# Patient Record
Sex: FEMALE | Race: WHITE | NOT HISPANIC OR LATINO | Employment: UNEMPLOYED | URBAN - METROPOLITAN AREA
[De-identification: names, ages, dates, MRNs, and addresses within clinical notes are randomized per-mention and may not be internally consistent; named-entity substitution may affect disease eponyms.]

---

## 2018-08-21 ENCOUNTER — OFFICE VISIT (OUTPATIENT)
Dept: GASTROENTEROLOGY | Facility: CLINIC | Age: 28
End: 2018-08-21

## 2018-08-21 VITALS
BODY MASS INDEX: 20.91 KG/M2 | TEMPERATURE: 97.7 F | SYSTOLIC BLOOD PRESSURE: 108 MMHG | HEIGHT: 67 IN | DIASTOLIC BLOOD PRESSURE: 76 MMHG | WEIGHT: 133.2 LBS

## 2018-08-21 DIAGNOSIS — K21.9 GASTROESOPHAGEAL REFLUX DISEASE, ESOPHAGITIS PRESENCE NOT SPECIFIED: Primary | ICD-10-CM

## 2018-08-21 PROCEDURE — 99203 OFFICE O/P NEW LOW 30 MIN: CPT | Performed by: INTERNAL MEDICINE

## 2018-08-21 RX ORDER — ESCITALOPRAM OXALATE 10 MG/1
10 TABLET ORAL DAILY
COMMUNITY
End: 2019-10-07 | Stop reason: SDUPTHER

## 2018-08-21 NOTE — PROGRESS NOTES
Chief Complaint   Patient presents with   • Heartburn   • throat burning     Subjective      HPI     Sarah E Reyes is a 28 y.o. female who presents for evaluation of regurgitation and heartburn.        Vomiting after eating, usually within 5 minutes of eating.  Sx present for at least 3 years.  Worse with stress or menstrual periods  Last 5 days has had burning in throat/chest  Doesn't do well with ice cream and certain cheese.  No dysphagia.     Has seen GI in both [place] and [facility] in past - no prior endoscopy.      Past Medical History:   Diagnosis Date   • GERD (gastroesophageal reflux disease)        Current Outpatient Prescriptions:   •  escitalopram (LEXAPRO) 10 MG tablet, Take 10 mg by mouth Daily., Disp: , Rfl:   Allergies   Allergen Reactions   • Penicillins Hives     Social History     Social History   • Marital status:      Spouse name: N/A   • Number of children: N/A   • Years of education: N/A     Occupational History   • Not on file.     Social History Main Topics   • Smoking status: Never Smoker   • Smokeless tobacco: Not on file   • Alcohol use Not on file   • Drug use: Unknown   • Sexual activity: Not on file     Other Topics Concern   • Not on file     Social History Narrative   • No narrative on file     Family History   Problem Relation Age of Onset   • No Known Problems Mother    • No Known Problems Father      Review of Systems   Constitutional: Negative.    HENT: Positive for sore throat.    Respiratory: Negative.    Cardiovascular: Negative.    Gastrointestinal: Positive for vomiting.   Psychiatric/Behavioral: Negative.      Objective   Vitals:    08/21/18 1330   BP: 108/76   Temp: 97.7 °F (36.5 °C)     Physical Exam   Constitutional: She is oriented to person, place, and time. She appears well-developed and well-nourished.   HENT:   Mouth/Throat: Oropharynx is clear and moist.   Neck: Normal range of motion. Neck supple. No thyromegaly present.   Cardiovascular: Normal rate,  regular rhythm and normal heart sounds.    Pulmonary/Chest: Effort normal and breath sounds normal. No respiratory distress. She has no wheezes.   Abdominal: Soft. Bowel sounds are normal.   Lymphadenopathy:     She has no cervical adenopathy.   Neurological: She is alert and oriented to person, place, and time.   Skin: Skin is warm and dry.   Psychiatric: She has a normal mood and affect. Her behavior is normal. Judgment and thought content normal.   Vitals reviewed.    Assessment/Plan   Assessment:     1. Gastroesophageal reflux disease, esophagitis presence not specified    2.      Vomiting    Plan:   Recommend trial of low dose Prilosec  EGD will be scheduled  If negative, may consider gastric emptying scan        Jp Juarez M.D.  [facility]  [address] [place] [address]  Office: [phone]

## 2018-08-27 ENCOUNTER — ANESTHESIA EVENT (OUTPATIENT)
Dept: GASTROENTEROLOGY | Facility: HOSPITAL | Age: 28
End: 2018-08-27

## 2018-08-27 ENCOUNTER — HOSPITAL ENCOUNTER (OUTPATIENT)
Facility: HOSPITAL | Age: 28
Setting detail: HOSPITAL OUTPATIENT SURGERY
Discharge: HOME | End: 2018-08-27
Attending: INTERNAL MEDICINE | Admitting: INTERNAL MEDICINE

## 2018-08-27 ENCOUNTER — ANESTHESIA (OUTPATIENT)
Dept: GASTROENTEROLOGY | Facility: HOSPITAL | Age: 28
End: 2018-08-27

## 2018-08-27 VITALS
RESPIRATION RATE: 18 BRPM | TEMPERATURE: 97.6 F | HEART RATE: 61 BPM | WEIGHT: 133.06 LBS | BODY MASS INDEX: 20.88 KG/M2 | SYSTOLIC BLOOD PRESSURE: 100 MMHG | HEIGHT: 67 IN | DIASTOLIC BLOOD PRESSURE: 68 MMHG | OXYGEN SATURATION: 100 %

## 2018-08-27 DIAGNOSIS — K21.9 GASTROESOPHAGEAL REFLUX DISEASE, ESOPHAGITIS PRESENCE NOT SPECIFIED: ICD-10-CM

## 2018-08-27 LAB
B-HCG UR QL: NEGATIVE
INTERNAL NEGATIVE CONTROL: NEGATIVE
INTERNAL POSITIVE CONTROL: POSITIVE
Lab: NORMAL

## 2018-08-27 PROCEDURE — 81025 URINE PREGNANCY TEST: CPT | Performed by: INTERNAL MEDICINE

## 2018-08-27 PROCEDURE — 88312 SPECIAL STAINS GROUP 1: CPT | Performed by: INTERNAL MEDICINE

## 2018-08-27 PROCEDURE — 88305 TISSUE EXAM BY PATHOLOGIST: CPT | Performed by: INTERNAL MEDICINE

## 2018-08-27 PROCEDURE — 43239 EGD BIOPSY SINGLE/MULTIPLE: CPT | Performed by: INTERNAL MEDICINE

## 2018-08-27 PROCEDURE — 25010000002 PROPOFOL 10 MG/ML EMULSION: Performed by: ANESTHESIOLOGY

## 2018-08-27 RX ORDER — PROPOFOL 10 MG/ML
VIAL (ML) INTRAVENOUS CONTINUOUS PRN
Status: DISCONTINUED | OUTPATIENT
Start: 2018-08-27 | End: 2018-08-27 | Stop reason: SURG

## 2018-08-27 RX ORDER — SODIUM CHLORIDE, SODIUM LACTATE, POTASSIUM CHLORIDE, CALCIUM CHLORIDE 600; 310; 30; 20 MG/100ML; MG/100ML; MG/100ML; MG/100ML
30 INJECTION, SOLUTION INTRAVENOUS CONTINUOUS PRN
Status: DISCONTINUED | OUTPATIENT
Start: 2018-08-27 | End: 2018-08-27 | Stop reason: HOSPADM

## 2018-08-27 RX ORDER — PROPOFOL 10 MG/ML
VIAL (ML) INTRAVENOUS AS NEEDED
Status: DISCONTINUED | OUTPATIENT
Start: 2018-08-27 | End: 2018-08-27 | Stop reason: SURG

## 2018-08-27 RX ORDER — ESOMEPRAZOLE MAGNESIUM 40 MG/1
40 CAPSULE, DELAYED RELEASE ORAL DAILY
Qty: 30 CAPSULE | Refills: 5 | Status: SHIPPED | OUTPATIENT
Start: 2018-08-27 | End: 2018-11-06

## 2018-08-27 RX ORDER — LIDOCAINE HYDROCHLORIDE 20 MG/ML
INJECTION, SOLUTION INFILTRATION; PERINEURAL AS NEEDED
Status: DISCONTINUED | OUTPATIENT
Start: 2018-08-27 | End: 2018-08-27 | Stop reason: SURG

## 2018-08-27 RX ADMIN — SODIUM CHLORIDE, POTASSIUM CHLORIDE, SODIUM LACTATE AND CALCIUM CHLORIDE 30 ML/HR: 600; 310; 30; 20 INJECTION, SOLUTION INTRAVENOUS at 10:37

## 2018-08-27 RX ADMIN — LIDOCAINE HYDROCHLORIDE 60 MG: 20 INJECTION, SOLUTION INFILTRATION; PERINEURAL at 10:45

## 2018-08-27 RX ADMIN — PROPOFOL 100 MG: 10 INJECTION, EMULSION INTRAVENOUS at 10:45

## 2018-08-27 RX ADMIN — PROPOFOL 100 MCG/KG/MIN: 10 INJECTION, EMULSION INTRAVENOUS at 10:45

## 2018-08-27 NOTE — ANESTHESIA POSTPROCEDURE EVALUATION
Patient: Sarah E Reyes    Procedure Summary     Date:  08/27/18 Room / Location:  [facility] ENDOSCOPY 10 /  [facility]    Anesthesia Start:  1041 Anesthesia Stop:  1057    Procedure:  ESOPHAGOGASTRODUODENOSCOPY with biopsies (N/A Esophagus) Diagnosis:       Gastroesophageal reflux disease, esophagitis presence not specified      (Gastroesophageal reflux disease, esophagitis presence not specified [K21.9])    Surgeon:  Jp Juarez MD Provider:  Natalee Simpson MD    Anesthesia Type:  MAC ASA Status:  1          Anesthesia Type: MAC  Last vitals  BP   108/74 (08/27/18 1019)   Temp   36.7 °C (98 °F) (08/27/18 1019)   Pulse   58 (08/27/18 1019)   Resp   10 (08/27/18 1019)     SpO2   100 % (08/27/18 1019)     Post Anesthesia Care and Evaluation    Patient location during evaluation: bedside  Patient participation: complete - patient participated  Level of consciousness: awake  Pain management: adequate  Airway patency: patent  Anesthetic complications: No anesthetic complications    Cardiovascular status: acceptable  Respiratory status: acceptable  Hydration status: acceptable

## 2018-08-27 NOTE — ANESTHESIA PREPROCEDURE EVALUATION
Anesthesia Evaluation     Patient summary reviewed and Nursing notes reviewed                Airway   Mallampati: I  TM distance: >3 FB  Neck ROM: full  No difficulty expected  Dental - normal exam     Pulmonary - negative pulmonary ROS and normal exam   Cardiovascular - negative cardio ROS and normal exam        Neuro/Psych- negative ROS  GI/Hepatic/Renal/Endo - negative ROS     Musculoskeletal (-) negative ROS    Abdominal  - normal exam    Bowel sounds: normal.   Substance History - negative use     OB/GYN negative ob/gyn ROS         Other                        Anesthesia Plan    ASA 1     MAC     intravenous induction   Anesthetic plan and risks discussed with patient.

## 2018-08-28 LAB
CYTO UR: NORMAL
LAB AP CASE REPORT: NORMAL
LAB AP CLINICAL INFORMATION: NORMAL
PATH REPORT.FINAL DX SPEC: NORMAL
PATH REPORT.GROSS SPEC: NORMAL

## 2018-08-31 ENCOUNTER — TELEPHONE (OUTPATIENT)
Dept: GASTROENTEROLOGY | Facility: CLINIC | Age: 28
End: 2018-08-31

## 2018-08-31 DIAGNOSIS — R11.2 NAUSEA AND VOMITING, INTRACTABILITY OF VOMITING NOT SPECIFIED, UNSPECIFIED VOMITING TYPE: Primary | ICD-10-CM

## 2018-08-31 DIAGNOSIS — R12 HEARTBURN: ICD-10-CM

## 2018-09-21 ENCOUNTER — HOSPITAL ENCOUNTER (OUTPATIENT)
Dept: NUCLEAR MEDICINE | Facility: HOSPITAL | Age: 28
Discharge: HOME | End: 2018-09-21
Attending: INTERNAL MEDICINE

## 2018-09-21 DIAGNOSIS — R12 HEARTBURN: ICD-10-CM

## 2018-09-21 DIAGNOSIS — R11.2 NAUSEA AND VOMITING, INTRACTABILITY OF VOMITING NOT SPECIFIED, UNSPECIFIED VOMITING TYPE: ICD-10-CM

## 2018-09-21 PROCEDURE — 0 TECHNETIUM SULFUR COLLOID: Performed by: INTERNAL MEDICINE

## 2018-09-21 PROCEDURE — 78264 GASTRIC EMPTYING IMG STUDY: CPT

## 2018-09-21 PROCEDURE — A9541 TC99M SULFUR COLLOID: HCPCS | Performed by: INTERNAL MEDICINE

## 2018-09-21 RX ADMIN — TECHNETIUM TC 99M SULFUR COLLOID 1 DOSE: KIT at 07:22

## 2018-09-25 ENCOUNTER — TELEPHONE (OUTPATIENT)
Dept: GASTROENTEROLOGY | Facility: CLINIC | Age: 28
End: 2018-09-25

## 2018-09-25 DIAGNOSIS — K21.9 GASTROESOPHAGEAL REFLUX DISEASE, ESOPHAGITIS PRESENCE NOT SPECIFIED: Primary | ICD-10-CM

## 2018-09-25 DIAGNOSIS — R11.11 VOMITING WITHOUT NAUSEA, INTRACTABILITY OF VOMITING NOT SPECIFIED, UNSPECIFIED VOMITING TYPE: ICD-10-CM

## 2018-10-12 ENCOUNTER — APPOINTMENT (OUTPATIENT)
Dept: NUCLEAR MEDICINE | Facility: HOSPITAL | Age: 28
End: 2018-10-12
Attending: INTERNAL MEDICINE

## 2018-10-12 ENCOUNTER — APPOINTMENT (OUTPATIENT)
Dept: ULTRASOUND IMAGING | Facility: HOSPITAL | Age: 28
End: 2018-10-12
Attending: INTERNAL MEDICINE

## 2018-10-15 ENCOUNTER — OFFICE VISIT (OUTPATIENT)
Dept: ORTHOPEDIC SURGERY | Facility: CLINIC | Age: 28
End: 2018-10-15

## 2018-10-15 VITALS — HEIGHT: 67 IN | BODY MASS INDEX: 21.35 KG/M2 | WEIGHT: 136 LBS | TEMPERATURE: 98.4 F

## 2018-10-15 DIAGNOSIS — M92.71 FREIBERG'S DISEASE, RIGHT: Primary | ICD-10-CM

## 2018-10-15 DIAGNOSIS — M20.11 HALLUX VALGUS OF RIGHT FOOT: ICD-10-CM

## 2018-10-15 DIAGNOSIS — M85.60 BONE CYST: ICD-10-CM

## 2018-10-15 PROCEDURE — 73630 X-RAY EXAM OF FOOT: CPT | Performed by: ORTHOPAEDIC SURGERY

## 2018-10-15 PROCEDURE — 99204 OFFICE O/P NEW MOD 45 MIN: CPT | Performed by: ORTHOPAEDIC SURGERY

## 2018-10-15 NOTE — PROGRESS NOTES
New Patient Complaint      Patient: Sarah E Reyes  YOB: 1990 28 y.o. female  Medical Record Number: [id number]    Chief Complaints: Foot hurts    History of Present Illness:      She reports a 15 year history that is recently worsened over the last 2 years now severe intermittent stabbing aching pain around the base of the second toe worse with walking and running.  She's been seen in [place] in the past for this had multiple scans and some injections for her nerve type pain and symptoms have now worsened.    HPI    Allergies:   Allergies   Allergen Reactions   • Penicillins Unknown (See Comments)     Allergic as a child       Medications:   Current Outpatient Prescriptions on File Prior to Visit   Medication Sig   • escitalopram (LEXAPRO) 10 MG tablet Take 10 mg by mouth Daily.   • esomeprazole (nexIUM) 40 MG capsule Take 1 capsule by mouth Daily.     No current facility-administered medications on file prior to visit.        Past Medical History:   Diagnosis Date   • GERD (gastroesophageal reflux disease)      Past Surgical History:   Procedure Laterality Date   • ENDOSCOPY N/A 8/27/2018    Procedure: ESOPHAGOGASTRODUODENOSCOPY with biopsies;  Surgeon: Jp Juarez MD;  Location: [facility] ENDOSCOPY;  Service: Gastroenterology   • WISDOM TOOTH EXTRACTION       Social History     Occupational History   • Not on file.     Social History Main Topics   • Smoking status: Never Smoker   • Smokeless tobacco: Not on file   • Alcohol use 1.2 oz/week     2 Glasses of wine per week   • Drug use: No   • Sexual activity: Defer      Social History     Social History Narrative   • No narrative on file     Family History   Problem Relation Age of Onset   • No Known Problems Mother    • No Known Problems Father        Review of Systems: 14 point review of systems performed, positive pertinent findings identified in HPI. All remaining systems negative except night sweats, ringing in the ears, nausea  "vomiting, anxiety, eczema    Review of Systems      Physical Exam:   Vitals:    10/15/18 0857   Temp: 98.4 °F (36.9 °C)   Weight: 61.7 kg (136 lb)   Height: 170.2 cm (67\")     Physical Exam   Constitutional: pleasant, well developed   Eyes: sclera non icteric  Hearing : adequate for exam  Cardiovascular: palpable pulses in right foot, right calf/ thigh NT without sign of DVT  Respiratoy: breathing unlabored   Neurological: grossly sensate to LT throughout right LE  Psychiatric: oriented with normal mood and affect.   Lymphatic: No palpable popliteal lymphadenopathy right LE  Skin: intact throughout right leg/foot  Musculoskeletal: He is in September boots with about a 2-3 inch heel.  There is mild fullness around the second MTP joint with dorsal prominence and limited motion with discomfort on maximum dorsiflexion.  No focal pain plantarly under the second or third metatarsal and unable to elicit any neuritic symptoms in the second webspace.  Mild hallux valgus deformity with slight prominence dorsal medially but no pain to palpation or with range of motion.  No focal pain along the proximal aspect of the dorsolateral midfoot  Physical Exam  Ortho Exam    Radiology: 3 views the right foot ordered to evaluate pain reviewed and no prior x-rays available for comparison there appears to be avascular necrosis with some collapse of the second metatarsal head.  Very slight hallux valgus deformity as well as a well rounded cystic-appearing small lesion in the base of the fourth metatarsal.    Assessment/Plan: 1.  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  Right asymptomatic mild hallux valgus  3.  Right fourth metatarsal cystic change on x-ray proximally    Discussed treatment options with her today and told her that there was no \"fix\" that it would be never normal but I think we can improve painful symptoms.    We are going to get an MRI of her right foot for further evaluation of the cystic lesion " the base the fourth metatarsal as well as to the extent of avascular necrosis and see if there is any fragmentation or any intact cartilage over the second metatarsal head.    She understands to call to schedule follow-up appointment after MRI has been scheduled and rotary results the office.      Recommended that she see her primary care physician for non-orthopedic related issues outlined in review of symptoms

## 2018-10-23 ENCOUNTER — HOSPITAL ENCOUNTER (OUTPATIENT)
Dept: MRI IMAGING | Facility: HOSPITAL | Age: 28
Discharge: HOME | End: 2018-10-23
Attending: ORTHOPAEDIC SURGERY | Admitting: ORTHOPAEDIC SURGERY

## 2018-10-23 DIAGNOSIS — M92.71 FREIBERG'S DISEASE, RIGHT: ICD-10-CM

## 2018-10-23 PROCEDURE — 73718 MRI LOWER EXTREMITY W/O DYE: CPT

## 2018-10-26 ENCOUNTER — OFFICE VISIT (OUTPATIENT)
Dept: ORTHOPEDIC SURGERY | Facility: CLINIC | Age: 28
End: 2018-10-26

## 2018-10-26 VITALS — WEIGHT: 135.8 LBS | HEIGHT: 67 IN | TEMPERATURE: 98.2 F | BODY MASS INDEX: 21.31 KG/M2

## 2018-10-26 DIAGNOSIS — M92.71 FREIBERG'S DISEASE, RIGHT: Primary | ICD-10-CM

## 2018-10-26 PROCEDURE — 99213 OFFICE O/P EST LOW 20 MIN: CPT | Performed by: ORTHOPAEDIC SURGERY

## 2018-10-26 RX ORDER — VANCOMYCIN HYDROCHLORIDE 1 G/200ML
15 INJECTION, SOLUTION INTRAVENOUS ONCE
Status: CANCELLED | OUTPATIENT
Start: 2018-11-13

## 2018-10-26 NOTE — PROGRESS NOTES
"Foot Follow Up      Patient: Sarah E Reyes    YOB: 1990 28 y.o. female    Chief Complaints: Foot pain    History of Present Illness: Patient was initially seen on 10/15/18 and reported a 15 year history that is recently worsened over the last 2 years now severe intermittent stabbing aching pain around the base of the second toe worse with walking and running.  She's been seen in [place] in the past for this had multiple scans and some injections for her nerve type pain and symptoms have now worsened.    She was sent for MRI and is here today for further evaluation.      HPI    ROS: Foot pain  Past Medical History:   Diagnosis Date   • GERD (gastroesophageal reflux disease)      Physical Exam:   Vitals:    10/26/18 1522   Temp: 98.2 °F (36.8 °C)   TempSrc: Temporal Artery    Weight: 61.6 kg (135 lb 12.8 oz)   Height: 168.9 cm (66.5\")     Well developed with normal mood.  On exam there was mild swelling but no warmth erythema around the second MTP joint with palpable dorsal prominence and limited motion with discomfort.  Mild hallux valgus deformity but no tenderness to palpation or with range of motion.  She is nontender to the third metatarsal      Radiology: MRI films and report of the right foot dated 10/23/18 were reviewed and show chronic Freiberg's infraction there is cortical irregularity and depression of the second metatarsal head and development of a displaced chronic ossification extending above the medial aspect of the joint measuring 7 mm x 5 mm x 4 mm with second MTP joint effusion.  There is marrow edema within the head with mild subcortical cyst.  Marrow signal is normal in the second proximal phalanx      Assessment/Plan:  1.  Asymptomatic right mild hallux valgus  2.  Right second metatarsalgia with Freiberg's infraction.    We are long discussion regarding treatment options and reviewed there are multiple treatment methods and I reviewed those with her and she would like to " proceed with the simplest procedure as possible.  I would recommend exploration of the joint with debridement of any loose chondral fragments and synovitis.  At this point I would not plan on any type of realignment osteotomies or interpositional arthroplasties.    I reviewed the procedure and postoperative course in detail with her with associated risks benefits potential outcomes and complications which can include but are not limited to heart attack stroke death pneumonia infection bleeding damage to blood vessels and nerves or tendons blood clots pulmonary embolism persistent or worsening pain stiffness need for subsequent surgery and failure to return to presurgery and precondition levels of activity.  All of her questions answered to her full satisfaction will plan to proceed with this on an outpatient basis at a mutually convenient time.  She was encouraged to call she has any questions prior to surgery

## 2018-11-06 ENCOUNTER — APPOINTMENT (OUTPATIENT)
Dept: PREADMISSION TESTING | Facility: HOSPITAL | Age: 28
End: 2018-11-06

## 2018-11-06 VITALS
HEART RATE: 5 BPM | TEMPERATURE: 97.3 F | OXYGEN SATURATION: 100 % | RESPIRATION RATE: 18 BRPM | SYSTOLIC BLOOD PRESSURE: 109 MMHG | BODY MASS INDEX: 20.4 KG/M2 | WEIGHT: 130 LBS | HEIGHT: 67 IN | DIASTOLIC BLOOD PRESSURE: 75 MMHG

## 2018-11-06 LAB
DEPRECATED RDW RBC AUTO: 44.7 FL (ref 37–54)
ERYTHROCYTE [DISTWIDTH] IN BLOOD BY AUTOMATED COUNT: 12.8 % (ref 11.7–13)
HCT VFR BLD AUTO: 40.5 % (ref 35.6–45.5)
HGB BLD-MCNC: 13.2 G/DL (ref 11.9–15.5)
MCH RBC QN AUTO: 31.3 PG (ref 26.9–32)
MCHC RBC AUTO-ENTMCNC: 32.6 G/DL (ref 32.4–36.3)
MCV RBC AUTO: 96 FL (ref 80.5–98.2)
PLATELET # BLD AUTO: 241 10*3/MM3 (ref 140–500)
PMV BLD AUTO: 9.5 FL (ref 6–12)
RBC # BLD AUTO: 4.22 10*6/MM3 (ref 3.9–5.2)
WBC NRBC COR # BLD: 5.44 10*3/MM3 (ref 4.5–10.7)

## 2018-11-06 PROCEDURE — 85027 COMPLETE CBC AUTOMATED: CPT | Performed by: ORTHOPAEDIC SURGERY

## 2018-11-06 PROCEDURE — 36415 COLL VENOUS BLD VENIPUNCTURE: CPT

## 2018-11-06 NOTE — DISCHARGE INSTRUCTIONS
Take the following medications the morning of surgery with a small sip of water:        General Instructions:  • Do not eat solid food after midnight the night before surgery.  • You may drink clear liquids day of surgery but must stop at least one hour before your hospital arrival time.  • It is beneficial for you to have a clear drink that contains carbohydrates the day of surgery.  We suggest a 12 to 20 ounce bottle of Gatorade or Powerade for non-diabetic patients or a 12 to 20 ounce bottle of G2 or Powerade Zero for diabetic patients. (Pediatric patients, are not advised to drink a 12 to 20 ounce carbohydrate drink)    Clear liquids are liquids you can see through.  Nothing red in color.     Plain water                               Sports drinks  Sodas                                   Gelatin (Jell-O)  Fruit juices without pulp such as white grape juice and apple juice  Popsicles that contain no fruit or yogurt  Tea or coffee (no cream or milk added)  Gatorade / Powerade  G2 / Powerade Zero    • Infants may have breast milk up to four hours before surgery.  • Infants drinking formula may drink formula up to six hours before surgery.   • Patients who avoid smoking, chewing tobacco and alcohol for 4 weeks prior to surgery have a reduced risk of post-operative complications.  Quit smoking as many days before surgery as you can.  • Do not smoke, use chewing tobacco or drink alcohol the day of surgery.   • If applicable bring your C-PAP/ BI-PAP machine.  • Bring any papers given to you in the doctor’s office.  • Wear clean comfortable clothes and socks.  • Do not wear contact lenses or make-up.  Bring a case for your glasses.   • Bring crutches or walker if applicable.  • Remove all piercings.  Leave jewelry and any other valuables at home.  • Hair extensions with metal clips must be removed prior to surgery.  • The Pre-Admission Testing nurse will instruct you to bring medications if unable to obtain an accurate  list in Pre-Admission Testing.        If you were given a blood bank ID arm band remember to bring it with you the day of surgery.    Preventing a Surgical Site Infection:  • For 2 to 3 days before surgery, avoid shaving with a razor because the razor can irritate skin and make it easier to develop an infection.    • Any areas of open skin can increase the risk of a post-operative wound infection by allowing bacteria to enter and travel throughout the body.  Notify your surgeon if you have any skin wounds / rashes even if it is not near the expected surgical site.  The area will need assessed to determine if surgery should be delayed until it is healed.  • The night prior to surgery sleep in a clean bed with clean clothing.  Do not allow pets to sleep with you.  • Shower on the morning of surgery using a fresh bar of anti-bacterial soap (such as Dial) and clean washcloth.  Dry with a clean towel and dress in clean clothing.  • Ask your surgeon if you will be receiving antibiotics prior to surgery.  • Make sure you, your family, and all healthcare providers clean their hands with soap and water or an alcohol based hand  before caring for you or your wound.    Day of surgery:  Upon arrival, a Pre-op nurse and Anesthesiologist will review your health history, obtain vital signs, and answer questions you may have.  The only belongings needed at this time will be your home medications and if applicable your C-PAP/BI-PAP machine.  If you are staying overnight your family can leave the rest of your belongings in the car and bring them to your room later.  A Pre-op nurse will start an IV and you may receive medication in preparation for surgery, including something to help you relax.  Your family will be able to see you in the Pre-op area.  While you are in surgery your family should notify the waiting room  if they leave the waiting room area and provide a contact phone number.    Please be aware that  surgery does come with discomfort.  We want to make every effort to control your discomfort so please discuss any uncontrolled symptoms with your nurse.   Your doctor will most likely have prescribed pain medications.      If you are going home after surgery you will receive individualized written care instructions before being discharged.  A responsible adult must drive you to and from the hospital on the day of your surgery and stay with you for 24 hours.    If you are staying overnight following surgery, you will be transported to your hospital room following the recovery period.  [facility] has all private rooms.    You have received a list of surgical assistants for your reference.  If you have any questions please call Pre-Admission Testing at [phone].  Deductibles and co-payments are collected on the day of service. Please be prepared to pay the required co-pay, deductible or deposit on the day of service as defined by your plan.

## 2018-11-07 ENCOUNTER — TELEPHONE (OUTPATIENT)
Dept: GASTROENTEROLOGY | Facility: CLINIC | Age: 28
End: 2018-11-07

## 2018-11-07 NOTE — TELEPHONE ENCOUNTER
----- Message from Jp Juarez MD sent at 11/2/2018  1:34 PM EDT -----  Attempted to call pt to explain possibility of erroneous results from her GES  Left VM to call back

## 2018-11-11 NOTE — H&P
"Patient: Sarah E Reyes     YOB: 1990 28 y.o. female     Chief Complaints: Foot pain     History of Present Illness: Patient was initially seen on 10/15/18 and reported a 15 year history that is recently worsened over the last 2 years now severe intermittent stabbing aching pain around the base of the second toe worse with walking and running.  She's been seen in [place] in the past for this had multiple scans and some injections for her nerve type pain and symptoms have now worsened.     She was sent for MRI and is here today for further evaluation.        HPI     ROS: Foot pain  Medical History        Past Medical History:   Diagnosis Date   • GERD (gastroesophageal reflux disease)           Physical Exam:   Vitals       Vitals:     10/26/18 1522   Temp: 98.2 °F (36.8 °C)   TempSrc: Temporal Artery    Weight: 61.6 kg (135 lb 12.8 oz)   Height: 168.9 cm (66.5\")         Well developed with normal mood.  On exam there was mild swelling but no warmth erythema around the second MTP joint with palpable dorsal prominence and limited motion with discomfort.  Mild hallux valgus deformity but no tenderness to palpation or with range of motion.  She is nontender to the third metatarsal        Radiology: MRI films and report of the right foot dated 10/23/18 were reviewed and show chronic Freiberg's infraction there is cortical irregularity and depression of the second metatarsal head and development of a displaced chronic ossification extending above the medial aspect of the joint measuring 7 mm x 5 mm x 4 mm with second MTP joint effusion.  There is marrow edema within the head with mild subcortical cyst.  Marrow signal is normal in the second proximal phalanx        Assessment/Plan:  1.  Asymptomatic right mild hallux valgus  2.  Right second metatarsalgia with Freiberg's infraction.     We are long discussion regarding treatment options and reviewed there are multiple treatment methods and I reviewed those " with her and she would like to proceed with the simplest procedure as possible.  I would recommend exploration of the joint with debridement of any loose chondral fragments and synovitis.  At this point I would not plan on any type of realignment osteotomies or interpositional arthroplasties.     I reviewed the procedure and postoperative course in detail with her with associated risks benefits potential outcomes and complications which can include but are not limited to heart attack stroke death pneumonia infection bleeding damage to blood vessels and nerves or tendons blood clots pulmonary embolism persistent or worsening pain stiffness need for subsequent surgery and failure to return to presurgery and precondition levels of activity.  All of her questions answered to her full satisfaction will plan to proceed with this on an outpatient basis at a mutually convenient time.  She was encouraged to call she has any questions prior to surgery

## 2018-11-12 NOTE — TELEPHONE ENCOUNTER
Recalculation suggests it may be slightly abnormal, but doubt clinically significant.    Would be happy to discuss over phone or she can certainly come in to office to discuss

## 2018-11-12 NOTE — TELEPHONE ENCOUNTER
Called pt and advised of the note from Dr Juarez. She verb understanding and asks that MD call her to discuss findings and her current symptoms. She is still having issues and the next step he wanted was U/S and HIDA. She has not scheduled that yet but would like to discuss first, then maybe schedule tests vs office visit. Advised will send her message back to Dr ANDERSON and have him call her. Pt verb understanding.

## 2018-11-13 ENCOUNTER — ANESTHESIA (OUTPATIENT)
Dept: PERIOP | Facility: HOSPITAL | Age: 28
End: 2018-11-13

## 2018-11-13 ENCOUNTER — TELEPHONE (OUTPATIENT)
Dept: ORTHOPEDIC SURGERY | Facility: CLINIC | Age: 28
End: 2018-11-13

## 2018-11-13 ENCOUNTER — HOSPITAL ENCOUNTER (OUTPATIENT)
Facility: HOSPITAL | Age: 28
Setting detail: HOSPITAL OUTPATIENT SURGERY
Discharge: HOME | End: 2018-11-13
Attending: ORTHOPAEDIC SURGERY | Admitting: ORTHOPAEDIC SURGERY

## 2018-11-13 ENCOUNTER — ANESTHESIA EVENT (OUTPATIENT)
Dept: PERIOP | Facility: HOSPITAL | Age: 28
End: 2018-11-13

## 2018-11-13 VITALS
SYSTOLIC BLOOD PRESSURE: 111 MMHG | OXYGEN SATURATION: 99 % | TEMPERATURE: 97.8 F | HEART RATE: 74 BPM | RESPIRATION RATE: 16 BRPM | DIASTOLIC BLOOD PRESSURE: 72 MMHG

## 2018-11-13 DIAGNOSIS — M92.71 FREIBERG'S DISEASE, RIGHT: ICD-10-CM

## 2018-11-13 PROCEDURE — 25010000002 VANCOMYCIN PER 500 MG: Performed by: ORTHOPAEDIC SURGERY

## 2018-11-13 PROCEDURE — 25010000002 MIDAZOLAM PER 1 MG: Performed by: ANESTHESIOLOGY

## 2018-11-13 PROCEDURE — 25010000002 FENTANYL CITRATE (PF) 100 MCG/2ML SOLUTION: Performed by: ANESTHESIOLOGY

## 2018-11-13 PROCEDURE — 25010000002 DEXAMETHASONE PER 1 MG: Performed by: NURSE ANESTHETIST, CERTIFIED REGISTERED

## 2018-11-13 PROCEDURE — 25010000002 PROPOFOL 10 MG/ML EMULSION: Performed by: NURSE ANESTHETIST, CERTIFIED REGISTERED

## 2018-11-13 PROCEDURE — 25010000002 ONDANSETRON PER 1 MG: Performed by: NURSE ANESTHETIST, CERTIFIED REGISTERED

## 2018-11-13 PROCEDURE — 25010000002 KETOROLAC TROMETHAMINE PER 15 MG: Performed by: NURSE ANESTHETIST, CERTIFIED REGISTERED

## 2018-11-13 PROCEDURE — 28122 PARTIAL REMOVAL OF FOOT BONE: CPT | Performed by: ORTHOPAEDIC SURGERY

## 2018-11-13 PROCEDURE — 81025 URINE PREGNANCY TEST: CPT | Performed by: ANESTHESIOLOGY

## 2018-11-13 RX ORDER — PROMETHAZINE HYDROCHLORIDE 25 MG/ML
12.5 INJECTION, SOLUTION INTRAMUSCULAR; INTRAVENOUS ONCE AS NEEDED
Status: DISCONTINUED | OUTPATIENT
Start: 2018-11-13 | End: 2018-11-13 | Stop reason: HOSPADM

## 2018-11-13 RX ORDER — PROMETHAZINE HYDROCHLORIDE 25 MG/1
25 SUPPOSITORY RECTAL ONCE AS NEEDED
Status: DISCONTINUED | OUTPATIENT
Start: 2018-11-13 | End: 2018-11-13 | Stop reason: HOSPADM

## 2018-11-13 RX ORDER — FAMOTIDINE 10 MG/ML
20 INJECTION, SOLUTION INTRAVENOUS ONCE
Status: COMPLETED | OUTPATIENT
Start: 2018-11-13 | End: 2018-11-13

## 2018-11-13 RX ORDER — ONDANSETRON 2 MG/ML
INJECTION INTRAMUSCULAR; INTRAVENOUS AS NEEDED
Status: DISCONTINUED | OUTPATIENT
Start: 2018-11-13 | End: 2018-11-13 | Stop reason: SURG

## 2018-11-13 RX ORDER — OXYCODONE HYDROCHLORIDE AND ACETAMINOPHEN 5; 325 MG/1; MG/1
1 TABLET ORAL ONCE AS NEEDED
Status: DISCONTINUED | OUTPATIENT
Start: 2018-11-13 | End: 2018-11-13 | Stop reason: HOSPADM

## 2018-11-13 RX ORDER — PROMETHAZINE HYDROCHLORIDE 25 MG/1
25 TABLET ORAL ONCE AS NEEDED
Status: DISCONTINUED | OUTPATIENT
Start: 2018-11-13 | End: 2018-11-13 | Stop reason: HOSPADM

## 2018-11-13 RX ORDER — SODIUM CHLORIDE, SODIUM LACTATE, POTASSIUM CHLORIDE, CALCIUM CHLORIDE 600; 310; 30; 20 MG/100ML; MG/100ML; MG/100ML; MG/100ML
9 INJECTION, SOLUTION INTRAVENOUS CONTINUOUS
Status: DISCONTINUED | OUTPATIENT
Start: 2018-11-13 | End: 2018-11-13 | Stop reason: HOSPADM

## 2018-11-13 RX ORDER — LABETALOL HYDROCHLORIDE 5 MG/ML
5 INJECTION, SOLUTION INTRAVENOUS
Status: DISCONTINUED | OUTPATIENT
Start: 2018-11-13 | End: 2018-11-13 | Stop reason: HOSPADM

## 2018-11-13 RX ORDER — DIPHENHYDRAMINE HYDROCHLORIDE 50 MG/ML
12.5 INJECTION INTRAMUSCULAR; INTRAVENOUS
Status: DISCONTINUED | OUTPATIENT
Start: 2018-11-13 | End: 2018-11-13 | Stop reason: HOSPADM

## 2018-11-13 RX ORDER — MIDAZOLAM HYDROCHLORIDE 1 MG/ML
2 INJECTION INTRAMUSCULAR; INTRAVENOUS
Status: DISCONTINUED | OUTPATIENT
Start: 2018-11-13 | End: 2018-11-13 | Stop reason: HOSPADM

## 2018-11-13 RX ORDER — DEXAMETHASONE SODIUM PHOSPHATE 10 MG/ML
INJECTION INTRAMUSCULAR; INTRAVENOUS AS NEEDED
Status: DISCONTINUED | OUTPATIENT
Start: 2018-11-13 | End: 2018-11-13 | Stop reason: SURG

## 2018-11-13 RX ORDER — ERYTHROMYCIN 500 MG/1
500 TABLET, COATED ORAL EVERY 6 HOURS
Qty: 8 TABLET | Refills: 0 | Status: SHIPPED | OUTPATIENT
Start: 2018-11-13 | End: 2018-11-26

## 2018-11-13 RX ORDER — FENTANYL CITRATE 50 UG/ML
50 INJECTION, SOLUTION INTRAMUSCULAR; INTRAVENOUS
Status: DISCONTINUED | OUTPATIENT
Start: 2018-11-13 | End: 2018-11-13 | Stop reason: HOSPADM

## 2018-11-13 RX ORDER — LIDOCAINE HYDROCHLORIDE 20 MG/ML
INJECTION, SOLUTION INFILTRATION; PERINEURAL AS NEEDED
Status: DISCONTINUED | OUTPATIENT
Start: 2018-11-13 | End: 2018-11-13 | Stop reason: SURG

## 2018-11-13 RX ORDER — ONDANSETRON 2 MG/ML
4 INJECTION INTRAMUSCULAR; INTRAVENOUS ONCE AS NEEDED
Status: DISCONTINUED | OUTPATIENT
Start: 2018-11-13 | End: 2018-11-13 | Stop reason: HOSPADM

## 2018-11-13 RX ORDER — NALOXONE HCL 0.4 MG/ML
0.2 VIAL (ML) INJECTION AS NEEDED
Status: DISCONTINUED | OUTPATIENT
Start: 2018-11-13 | End: 2018-11-13 | Stop reason: HOSPADM

## 2018-11-13 RX ORDER — SODIUM CHLORIDE 0.9 % (FLUSH) 0.9 %
1-10 SYRINGE (ML) INJECTION AS NEEDED
Status: DISCONTINUED | OUTPATIENT
Start: 2018-11-13 | End: 2018-11-13 | Stop reason: HOSPADM

## 2018-11-13 RX ORDER — OXYCODONE HYDROCHLORIDE AND ACETAMINOPHEN 5; 325 MG/1; MG/1
1-2 TABLET ORAL EVERY 4 HOURS PRN
Qty: 60 TABLET | Refills: 0 | Status: SHIPPED | OUTPATIENT
Start: 2018-11-13 | End: 2018-11-26

## 2018-11-13 RX ORDER — OXYCODONE AND ACETAMINOPHEN 7.5; 325 MG/1; MG/1
1 TABLET ORAL ONCE AS NEEDED
Status: DISCONTINUED | OUTPATIENT
Start: 2018-11-13 | End: 2018-11-13 | Stop reason: HOSPADM

## 2018-11-13 RX ORDER — FLUMAZENIL 0.1 MG/ML
0.2 INJECTION INTRAVENOUS AS NEEDED
Status: DISCONTINUED | OUTPATIENT
Start: 2018-11-13 | End: 2018-11-13 | Stop reason: HOSPADM

## 2018-11-13 RX ORDER — HYDROMORPHONE HYDROCHLORIDE 1 MG/ML
0.5 INJECTION, SOLUTION INTRAMUSCULAR; INTRAVENOUS; SUBCUTANEOUS
Status: DISCONTINUED | OUTPATIENT
Start: 2018-11-13 | End: 2018-11-13 | Stop reason: HOSPADM

## 2018-11-13 RX ORDER — PROMETHAZINE HYDROCHLORIDE 25 MG/1
12.5 TABLET ORAL ONCE AS NEEDED
Status: DISCONTINUED | OUTPATIENT
Start: 2018-11-13 | End: 2018-11-13 | Stop reason: HOSPADM

## 2018-11-13 RX ORDER — MAGNESIUM HYDROXIDE 1200 MG/15ML
LIQUID ORAL AS NEEDED
Status: DISCONTINUED | OUTPATIENT
Start: 2018-11-13 | End: 2018-11-13 | Stop reason: HOSPADM

## 2018-11-13 RX ORDER — HYDROCODONE BITARTRATE AND ACETAMINOPHEN 7.5; 325 MG/1; MG/1
1 TABLET ORAL ONCE AS NEEDED
Status: DISCONTINUED | OUTPATIENT
Start: 2018-11-13 | End: 2018-11-13 | Stop reason: HOSPADM

## 2018-11-13 RX ORDER — LIDOCAINE HYDROCHLORIDE 10 MG/ML
0.5 INJECTION, SOLUTION EPIDURAL; INFILTRATION; INTRACAUDAL; PERINEURAL ONCE AS NEEDED
Status: DISCONTINUED | OUTPATIENT
Start: 2018-11-13 | End: 2018-11-13 | Stop reason: HOSPADM

## 2018-11-13 RX ORDER — VANCOMYCIN HYDROCHLORIDE 1 G/200ML
15 INJECTION, SOLUTION INTRAVENOUS ONCE
Status: COMPLETED | OUTPATIENT
Start: 2018-11-13 | End: 2018-11-13

## 2018-11-13 RX ORDER — KETOROLAC TROMETHAMINE 30 MG/ML
INJECTION, SOLUTION INTRAMUSCULAR; INTRAVENOUS AS NEEDED
Status: DISCONTINUED | OUTPATIENT
Start: 2018-11-13 | End: 2018-11-13 | Stop reason: SURG

## 2018-11-13 RX ORDER — ACETAMINOPHEN 650 MG
TABLET, EXTENDED RELEASE ORAL AS NEEDED
Status: DISCONTINUED | OUTPATIENT
Start: 2018-11-13 | End: 2018-11-13 | Stop reason: HOSPADM

## 2018-11-13 RX ORDER — MIDAZOLAM HYDROCHLORIDE 1 MG/ML
1 INJECTION INTRAMUSCULAR; INTRAVENOUS
Status: DISCONTINUED | OUTPATIENT
Start: 2018-11-13 | End: 2018-11-13 | Stop reason: HOSPADM

## 2018-11-13 RX ORDER — PROPOFOL 10 MG/ML
VIAL (ML) INTRAVENOUS AS NEEDED
Status: DISCONTINUED | OUTPATIENT
Start: 2018-11-13 | End: 2018-11-13 | Stop reason: SURG

## 2018-11-13 RX ORDER — EPHEDRINE SULFATE 50 MG/ML
5 INJECTION, SOLUTION INTRAVENOUS ONCE AS NEEDED
Status: DISCONTINUED | OUTPATIENT
Start: 2018-11-13 | End: 2018-11-13 | Stop reason: HOSPADM

## 2018-11-13 RX ADMIN — FAMOTIDINE 20 MG: 10 INJECTION, SOLUTION INTRAVENOUS at 08:41

## 2018-11-13 RX ADMIN — MIDAZOLAM 1 MG: 1 INJECTION INTRAMUSCULAR; INTRAVENOUS at 08:48

## 2018-11-13 RX ADMIN — MIDAZOLAM 2 MG: 1 INJECTION INTRAMUSCULAR; INTRAVENOUS at 08:47

## 2018-11-13 RX ADMIN — ONDANSETRON 4 MG: 2 INJECTION INTRAMUSCULAR; INTRAVENOUS at 09:59

## 2018-11-13 RX ADMIN — KETOROLAC TROMETHAMINE 30 MG: 30 INJECTION, SOLUTION INTRAMUSCULAR; INTRAVENOUS at 10:22

## 2018-11-13 RX ADMIN — DEXAMETHASONE SODIUM PHOSPHATE 8 MG: 10 INJECTION INTRAMUSCULAR; INTRAVENOUS at 09:59

## 2018-11-13 RX ADMIN — FENTANYL CITRATE 100 MCG: 50 INJECTION, SOLUTION INTRAMUSCULAR; INTRAVENOUS at 08:47

## 2018-11-13 RX ADMIN — SODIUM CHLORIDE, POTASSIUM CHLORIDE, SODIUM LACTATE AND CALCIUM CHLORIDE 9 ML/HR: 600; 310; 30; 20 INJECTION, SOLUTION INTRAVENOUS at 08:34

## 2018-11-13 RX ADMIN — LIDOCAINE HYDROCHLORIDE 100 MG: 20 INJECTION, SOLUTION INFILTRATION; PERINEURAL at 09:26

## 2018-11-13 RX ADMIN — SODIUM CHLORIDE, POTASSIUM CHLORIDE, SODIUM LACTATE AND CALCIUM CHLORIDE: 600; 310; 30; 20 INJECTION, SOLUTION INTRAVENOUS at 10:50

## 2018-11-13 RX ADMIN — PROPOFOL 170 MG: 10 INJECTION, EMULSION INTRAVENOUS at 09:26

## 2018-11-13 RX ADMIN — VANCOMYCIN HYDROCHLORIDE 1000 MG: 1 INJECTION, SOLUTION INTRAVENOUS at 08:59

## 2018-11-13 NOTE — BRIEF OP NOTE
INCISION AND DRAINAGE LOWER EXTREMITY  Progress Note    Sarah E Reyes  11/13/2018    Pre-op Diagnosis:   Freiberg's disease, right [M92.71]       Post-Op Diagnosis Codes:     * Freiberg's disease, right [M92.71]    Procedure/CPT® Codes:      Procedure(s):  Release and debridement of right second MTP joint and partial removal of metatarsal head    Surgeon(s):  Danial Michel MD    Anesthesia: General with Block    Staff:   Circulator: Javi Schneider RN; Becky Pavon RN  Scrub Person: Liliane Miles; Maddi Napoles    Estimated Blood Loss: minimal    Urine Voided: * No values recorded between 11/13/2018  9:17 AM and 11/13/2018 10:37 AM *    Specimens:                None      Drains:  None    TT 48 min    Findings: see dict    Complications:none      Danial Michel MD     Date: 11/13/2018  Time: 10:41 AM

## 2018-11-13 NOTE — ANESTHESIA PREPROCEDURE EVALUATION
Anesthesia Evaluation     Patient summary reviewed and Nursing notes reviewed   NPO Solid Status: > 8 hours  NPO Liquid Status: > 2 hours           Airway   Mallampati: I  TM distance: >3 FB  Neck ROM: full  Dental - normal exam     Pulmonary - negative pulmonary ROS and normal exam   Cardiovascular - negative cardio ROS and normal exam        Neuro/Psych- negative ROS  GI/Hepatic/Renal/Endo    (+)  GERD,      Musculoskeletal (-) negative ROS    Abdominal  - normal exam    Bowel sounds: normal.   Substance History - negative use     OB/GYN negative ob/gyn ROS         Other                          Anesthesia Plan    ASA 1     general and regional     Anesthetic plan, all risks, benefits, and alternatives have been provided, discussed and informed consent has been obtained with: patient.

## 2018-11-13 NOTE — ANESTHESIA POSTPROCEDURE EVALUATION
Patient: Sarah E Reyes    Procedure Summary     Date:  11/13/18 Room / Location:   [facility]  /  [facility]    Anesthesia Start:  0920 Anesthesia Stop:  1051    Procedure:  Debridement of right second MTP joint as needed (Right ) Diagnosis:       Freiberg's disease, right      (Freiberg's disease, right [M92.71])    Surgeon:  Danial Michel MD Provider:  Tnaner Gonzalez MD    Anesthesia Type:  general, regional ASA Status:  1          Anesthesia Type: general, regional  Last vitals  BP   125/77 (11/13/18 1127)   Temp   36.6 °C (97.8 °F) (11/13/18 1115)   Pulse   70 (11/13/18 1127)   Resp   16 (11/13/18 1127)     SpO2   99 % (11/13/18 1127)     Post Anesthesia Care and Evaluation    Patient location during evaluation: bedside  Patient participation: complete - patient participated  Level of consciousness: awake  Pain score: 2  Pain management: adequate  Airway patency: patent  Anesthetic complications: No anesthetic complications  PONV Status: none  Cardiovascular status: acceptable  Respiratory status: acceptable  Hydration status: acceptable    Comments: /77 (BP Location: Right arm, Patient Position: Sitting)   Pulse 70   Temp 36.6 °C (97.8 °F) (Temporal)   Resp 16   LMP 11/05/2018 (Exact Date)   SpO2 99%

## 2018-11-13 NOTE — TELEPHONE ENCOUNTER
I returned patient's phone call she had surgery this morning and I spoke with her .  When she got home and her foot hung down some she had some bleeding through on the gauze.  I reviewed the pictures that he sent on my chart does appear to be some saturation of the web roll but the bandages themselves look intact and toes appear to have good capillary refill.  They will elevate this reinforced the bandage and I will see them in the office in the morning to change this

## 2018-11-13 NOTE — TELEPHONE ENCOUNTER
Notes recorded by Jp Juarez MD on 11/12/2018 at 2:50 PM EST  Results d/w pt  She will go through with JANUSZ u/s and KATHLEEN, then we will regroup after that.

## 2018-11-13 NOTE — ANESTHESIA PROCEDURE NOTES
ANESTHESIA INTUBATION  Urgency: elective    Date/Time: 11/13/2018 9:36 AM  End Time:11/13/2018 9:36 AM  Airway not difficult    General Information and Staff    Patient location during procedure: OR  Anesthesiologist: Tanner Gonzalez MD  CRNA: Kaylen Renee CRNA    Indications and Patient Condition  Indications for airway management: airway protection    Preoxygenated: yes  MILS maintained throughout  Mask difficulty assessment: 1 - vent by mask    Final Airway Details  Final airway type: supraglottic airway      Successful airway: oropharyngeal  Size 4    Number of attempts at approach: 1

## 2018-11-13 NOTE — ANESTHESIA POSTPROCEDURE EVALUATION
Patient: Sarah E Reyes    Procedure Summary     Date:  11/13/18 Room / Location:   [facility]  [facility]  [facility]    Anesthesia Start:  0920 Anesthesia Stop:  1051    Procedure:  Debridement of right second MTP joint as needed (Right ) Diagnosis:       Freiberg's disease, right      (Freiberg's disease, right [M92.71])    Surgeon:  Danial Michel MD Provider:  Tanner Gonzalez MD    Anesthesia Type:  general, regional ASA Status:  1          Anesthesia Type: general, regional  Last vitals  BP   111/72 (11/13/18 1205)   Temp   36.6 °C (97.8 °F) (11/13/18 1115)   Pulse   74 (11/13/18 1205)   Resp   16 (11/13/18 1205)     SpO2   99 % (11/13/18 1205)     Post Anesthesia Care and Evaluation    Patient location during evaluation: bedside  Patient participation: complete - patient participated  Level of consciousness: awake  Pain score: 2  Pain management: adequate  Airway patency: patent  Anesthetic complications: No anesthetic complications  PONV Status: none  Cardiovascular status: acceptable  Respiratory status: acceptable  Hydration status: acceptable    Comments: /72 (BP Location: Right arm, Patient Position: Lying)   Pulse 74   Temp 36.6 °C (97.8 °F) (Temporal)   Resp 16   LMP 11/05/2018 (Exact Date)   SpO2 99%

## 2018-11-14 ENCOUNTER — OFFICE VISIT (OUTPATIENT)
Dept: ORTHOPEDIC SURGERY | Facility: CLINIC | Age: 28
End: 2018-11-14

## 2018-11-14 VITALS — WEIGHT: 130 LBS | TEMPERATURE: 98.1 F | HEIGHT: 67 IN | BODY MASS INDEX: 20.4 KG/M2

## 2018-11-14 DIAGNOSIS — M92.71 FREIBERG'S DISEASE, RIGHT: Primary | ICD-10-CM

## 2018-11-14 PROCEDURE — 99024 POSTOP FOLLOW-UP VISIT: CPT | Performed by: ORTHOPAEDIC SURGERY

## 2018-11-14 PROCEDURE — 29540 STRAPPING ANKLE &/FOOT: CPT | Performed by: ORTHOPAEDIC SURGERY

## 2018-11-14 NOTE — OP NOTE
Operative Note      Facility: [facility]  Patient Name: Sarah E Reyes  YOB: 1990  Date: 11/13/18  Medical Record Number: [id number]      Pre-op Diagnosis: 1.  Right second metatarsal Freiberg's infraction with dorsal fragmentation and joint contracture      Post-op Diagnosis:   1.  Right second metatarsal Freiberg's infraction with dorsal fragmentation and joint contracture      Procedure(s):  1.  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  She's been unimproved with conservative treatment and presents now for operative treatment.          Description of Procedure: Preoperative informed consent and anesthesia evaluation were obtained.  IV antibiotics were administered in the surgical site was marked.  Lock was administered by anesthesia.  Patient was brought to the operating room placed in supine position.  Anesthesia was induced and LMA was positioned.  Well-padded tourniquet was placed to the right lower leg.  Right foot and ankle were prepped and draped in a sterile fashion and surgical timeout was performed.  Preoperative motion of the second MTP joint was noted    Right foot was exsanguinated and pneumatic tourniquet inflated to 200 mmHg.  A medially based Chevron incision was created over the second  metatarsal phalangeal joint and full-thickness flaps were elevated with care taken to mobilize and protect subcutaneous veins and nerves were possible.  The extensor tendons were identified as was the dorsal cavanaugh expansion.  There was fullness to the joint noted and slight lateral deviation had been noted preoperatively.  The dorsal cavanaugh was divided between the longus and brevis tendons and extended proximally keeping these intact.  Full-thickness capsular flaps were elevated both medially and laterally and released somewhat laterally.  Immediately noted hyperemic synovium and a large amount of cartilaginous debris in the dorsum of the joint.  This was resected with a rongeur both dorsally medially and laterally back to more firm cancellus bone.  The base of the proximal phalanx was inspected and found to have some mild cartilaginous changes but no loose flaps.  The plantar 1/2-1/3 of the metatarsal head was intact and this cartilage was probed dorsally and found to be intact and not loose.  There was some mild fibrillary changes to the cartilage but no loose flaps.  I elected not to proceed with any further debridement or resection of the metatarsal head.  She had significantly improved motion and there was no instability noted on anterior drawer.    I did remove some dorsal bossing at the neck head junction..  The toe was taken to the range of motion and not found to have any further impingement.  I did resect the brevis proximally in order to relieve some tension on the joint but did not feel that she needed to have longus tenotomy.  The toe was stable and aligned neutrally with the other toes in a simulated standing position.  The wound was then irrigated copiously with dilute Betadine solution and the dorsal capsular tissue was closed with 3-0 Vicryl suture.  The wound was again irrigated copiously and closed with 4-0 Vicryl and 4-0 nylon.  The tourniquet was released and the wound was hemostatic and there was  prompt return of brisk capillary refill to the toe.    Sterile dressings were applied and she was placed into a foot and ankle spica-type dressing.  Oh stop if she was applied.  She was awake and transferred to stretcher and taken to recovery in stable condition

## 2018-11-14 NOTE — PROGRESS NOTES
"Foot Follow Up      Patient: Sarah E Reyes    YOB: 1990 28 y.o. female    Chief Complaints: Foot okay    History of Present Illness: Patient had surgery yesterday for debridement of Freiberg's infraction right second toe.  She had some bleeding through her dressing and is seen here today for dressing change.  Some mild aching pain but really \"not that bad\".  A little bit of tingling in her toe.  She did not start her postoperative antibiotics until this morning  HPI    ROS: Foot pain, no fevers chills chest pain shortness of breath  Past Medical History:   Diagnosis Date   • Anxiety    • GERD (gastroesophageal reflux disease)    • Right foot pain      Physical Exam:   Vitals:    11/14/18 0805   Temp: 98.1 °F (36.7 °C)   Weight: 59 kg (130 lb)   Height: 170.2 cm (67\")     Well developed with normal mood.  Right foot dressing was saturated with blood which is somewhat dried.  This was gently removed after moistening with saline.  There was no active bleeding from the wound or any evidence of wound dehiscence.  Brisk capillary refill to the toe and grossly sensate to light touch at the tip of the second toe      Radiology: None performed      Assessment/Plan:  Status post right Freiberg's infraction second toe debridement.  Dressing was changed.  She'll continue nonweightbearing and elevation and I will see her back in approximately 10 days  "

## 2018-11-16 ENCOUNTER — TELEPHONE (OUTPATIENT)
Dept: ORTHOPEDIC SURGERY | Facility: CLINIC | Age: 28
End: 2018-11-16

## 2018-11-16 NOTE — TELEPHONE ENCOUNTER
Have discussed with Dr. Michel.  Have left a prescription at the  for hydrocodone 7.5/325 mg #50 directions her to take 1-2 tablets every 4 hours when necessary pain.  Patient has been notified to pickup the prescription

## 2018-11-16 NOTE — TELEPHONE ENCOUNTER
Regarding: Prescription Question  Contact: [phone]  ----- Message from [employer], Generic sent at 11/16/2018  4:56 AM EST -----    Dr. Michel,  The nerve block wore off on my foot Wednesday around 10pm. I was woken up every 2 hours in intense pain even with taking 1 oxy every 4 hours. To substitute for taking more meds than I should within the 4 hour timeline I iced my foot and took Tylenol. I was in a lot of pain all day Thursday and again all through the night tonight. I ended up taking 1 oxy every 2 hours a few times thursday because of the extreme pain. I have had a terrible headache since about 9pm Thursday and itch a lot. My question is if oxy is the best pain medication and if you have some suggestions on how to take it or if there is something you can think of that would be a better alternative?

## 2018-11-16 NOTE — TELEPHONE ENCOUNTER
Call return to the patient.  She called complaining of a headache which she thought was due to her Percocet.  Over the first 2 days after surgery she was having increased pain so she was taking it every 2 hours.  Last night she didn't take anything for about 9 hours and did take one this morning and 11:00 and states that her headache is now resolved.  Complaining of itching which could also be related to the Percocet.  I reinforced the fact that she needs to take her medicine every 4 hours rather than every 2.  Headache could indeed been from her Percocet.  Have discussed with her about trying some hydrocodone.  And patient is agreeable.  She denies that her dressing is too tight and toes are warm and pain.  Will discuss with Dr. Michel

## 2018-11-21 ENCOUNTER — HOSPITAL ENCOUNTER (OUTPATIENT)
Dept: NUCLEAR MEDICINE | Facility: HOSPITAL | Age: 28
Discharge: HOME | End: 2018-11-21
Attending: INTERNAL MEDICINE

## 2018-11-21 ENCOUNTER — HOSPITAL ENCOUNTER (OUTPATIENT)
Dept: ULTRASOUND IMAGING | Facility: HOSPITAL | Age: 28
Discharge: HOME | End: 2018-11-21
Attending: INTERNAL MEDICINE | Admitting: INTERNAL MEDICINE

## 2018-11-21 DIAGNOSIS — K21.9 GASTROESOPHAGEAL REFLUX DISEASE, ESOPHAGITIS PRESENCE NOT SPECIFIED: ICD-10-CM

## 2018-11-21 DIAGNOSIS — R11.11 VOMITING WITHOUT NAUSEA, INTRACTABILITY OF VOMITING NOT SPECIFIED, UNSPECIFIED VOMITING TYPE: ICD-10-CM

## 2018-11-21 PROCEDURE — A9537 TC99M MEBROFENIN: HCPCS | Performed by: INTERNAL MEDICINE

## 2018-11-21 PROCEDURE — 0 TECHNETIUM TC 99M MEBROFENIN KIT: Performed by: INTERNAL MEDICINE

## 2018-11-21 PROCEDURE — 78226 HEPATOBILIARY SYSTEM IMAGING: CPT

## 2018-11-21 PROCEDURE — 76705 ECHO EXAM OF ABDOMEN: CPT

## 2018-11-21 RX ORDER — KIT FOR THE PREPARATION OF TECHNETIUM TC 99M MEBROFENIN 45 MG/10ML
1 INJECTION, POWDER, LYOPHILIZED, FOR SOLUTION INTRAVENOUS
Status: COMPLETED | OUTPATIENT
Start: 2018-11-21 | End: 2018-11-21

## 2018-11-21 RX ADMIN — MEBROFENIN 1 DOSE: 45 INJECTION, POWDER, LYOPHILIZED, FOR SOLUTION INTRAVENOUS at 09:25

## 2018-11-26 ENCOUNTER — OFFICE VISIT (OUTPATIENT)
Dept: ORTHOPEDIC SURGERY | Facility: CLINIC | Age: 28
End: 2018-11-26

## 2018-11-26 VITALS — BODY MASS INDEX: 20.4 KG/M2 | TEMPERATURE: 97.7 F | WEIGHT: 130 LBS | HEIGHT: 67 IN

## 2018-11-26 DIAGNOSIS — M92.71 FREIBERG'S DISEASE, RIGHT: Primary | ICD-10-CM

## 2018-11-26 PROCEDURE — 99024 POSTOP FOLLOW-UP VISIT: CPT | Performed by: ORTHOPAEDIC SURGERY

## 2018-11-26 NOTE — PROGRESS NOTES
"Foot Follow Up      Patient: Sarah E Reyes    YOB: 1990 28 y.o. female    Chief Complaints: Foot feeling better    History of Present Illness: Had debridement of right second metatarsal for Freiberg's infraction on 11/13/18 last seen for dressing change on 11/14/18.  She said her foot is feeling much better with only slight occasional aching is not using any pain medication since 11/23/18 and has been putting some partial weight on it.  HPI    ROS: Foot pain no fevers chills chest pain or shortness of breath  Past Medical History:   Diagnosis Date   • Anxiety    • GERD (gastroesophageal reflux disease)    • Right foot pain      Physical Exam:   Vitals:    11/26/18 0854   Temp: 97.7 °F (36.5 °C)   TempSrc: Temporal   Weight: 59 kg (130 lb)   Height: 170.2 cm (67\")     Well developed with normal mood.  Right foot shows only slight swelling incision appears to be healing well without sign of skin breakdown.  No significant pain with passive range of motion of the second MTP joint.  Second toe demonstrated brisk capillary refill      Radiology: None performed      Assessment/Plan:  Status post right second toe Freiberg's infraction debridement of metatarsal head.    Overall she's doing very well sutures removed and Steri-Strips are applied she may let this get wet in the shower and may progress to weightbearing as tolerated in her postoperative shoe and to mistreated passive range of motion exercises for her to do several times a day.    I will see her back in 2 weeks x-rays of her right foot  "

## 2018-11-27 ENCOUNTER — TELEPHONE (OUTPATIENT)
Dept: GASTROENTEROLOGY | Facility: CLINIC | Age: 28
End: 2018-11-27

## 2018-11-27 NOTE — TELEPHONE ENCOUNTER
Her u/s just resulted on Friday afternoon...    HIDA and U/s of gallbladder were both normal.  She can make office visit to discuss next steps.

## 2018-11-27 NOTE — TELEPHONE ENCOUNTER
----- Message from Samir Garcia sent at 11/27/2018 12:33 PM EST -----  Regarding: test report   Contact: [phone]  Calling for us and aleta messina

## 2018-11-28 ENCOUNTER — TELEPHONE (OUTPATIENT)
Dept: ORTHOPEDIC SURGERY | Facility: CLINIC | Age: 28
End: 2018-11-28

## 2018-11-28 NOTE — TELEPHONE ENCOUNTER
Regarding: Visit Follow-Up Question  Contact: [phone]  ----- Message from [employer], Generic sent at 11/27/2018  9:35 PM EST -----    I am having a lot of nerve pain on the top of my foot and have a lot of purple and green bruising on the entire top of my foot and 2nd and 3rd toes. Is this normal after 2 weeks post op?     Is it ok for me to use maderma for scarring at this point?    Also, now that I am off my crutches and going back to work, would it be possible for me to get a handicapped tag for parking?

## 2018-11-28 NOTE — TELEPHONE ENCOUNTER
"I spoke at length with patient today we'll mail a handicap temporary past to her she said is actually feeling much better gets some achiness but has been walking on it quite a bit and doing range of motion exercises gets some occasional \"nerve pain which is or is not unusual and should improve over time and is had some purplish greenish discoloration to the foot but no drainage.  Reviewed with her that this is likely some ecchymosis that may diffuse throughout portions of the foot.    Also counseled her against using any type of \"scar medicine\" on it at this point at least until I see her back.  She appreciated the call and says she's doing very well I will see her back as scheduled  "

## 2018-11-28 NOTE — TELEPHONE ENCOUNTER
Patient called, advised as per Dr. Juarez, her HIDA and liver u/s were normal. When asked if she would like to make a f/u appointment, she states she will call back.

## 2018-12-10 ENCOUNTER — OFFICE VISIT (OUTPATIENT)
Dept: ORTHOPEDIC SURGERY | Facility: CLINIC | Age: 28
End: 2018-12-10

## 2018-12-10 VITALS — TEMPERATURE: 98 F | WEIGHT: 130 LBS | HEIGHT: 67 IN | BODY MASS INDEX: 20.4 KG/M2

## 2018-12-10 DIAGNOSIS — Z09 SURGERY FOLLOW-UP: Primary | ICD-10-CM

## 2018-12-10 PROCEDURE — 73630 X-RAY EXAM OF FOOT: CPT | Performed by: ORTHOPAEDIC SURGERY

## 2018-12-10 PROCEDURE — 99024 POSTOP FOLLOW-UP VISIT: CPT | Performed by: ORTHOPAEDIC SURGERY

## 2018-12-10 NOTE — PROGRESS NOTES
"Foot Follow Up      Patient: Sarah E Reyes    YOB: 1990 28 y.o. female    Chief Complaints: Foot pain    History of Present Illness: She had debridement of right second metatarsal for Freiberg's infraction on 11/13/18 last seen on 11/26/18.  Pain is better than it was preoperatively but still complains of mild to moderate intermittent aching pain and stiffness in the right second toe.  She has been doing some range of motion exercises and using a postoperative shoe.  HPI    ROS: Foot pain  Past Medical History:   Diagnosis Date   • Anxiety    • GERD (gastroesophageal reflux disease)    • Right foot pain      Physical Exam:   Vitals:    12/10/18 0916   Temp: 98 °F (36.7 °C)   Weight: 59 kg (130 lb)   Height: 170.2 cm (67\")     Well developed with normal mood.  Right foot incision shows no warmth erythema or drainage there is mild swelling around the second MTP joint was somewhat limited motion with only about 30-40° dorsiflexion 20° plantarflexion.  Brisk capillary refill to the toe      Radiology: 3 views right foot ordered to evaluate postoperative alignment reviewed and compared with previous x-rays and reviewed with her.  There appears to be adequate decompression of the second metatarsal head      Assessment/Plan:  Is post right second metatarsal Freiberg's infraction debridement.    Reviewed with her that she may not be pain free but hopefully her pain will continue to improve and I would not recommend anything from a surgical standpoint further at this time.  We discussed formal therapy which she wants to hold off on.  She'll continue with aggressive range of motion exercises and in about 2 weeks may start weaning out of her postoperative shoe into a sturdy athletic shoe as pain allows.  That point she may start trying to get back and do some yoga but no running.    I will see her back in 6 weeks' x-rays of her right foot if she's having pain  "

## 2018-12-31 ENCOUNTER — TELEPHONE (OUTPATIENT)
Dept: ORTHOPEDIC SURGERY | Facility: CLINIC | Age: 28
End: 2018-12-31

## 2018-12-31 NOTE — TELEPHONE ENCOUNTER
Regarding: Non-Urgent Medical Question  Contact: [phone]  ----- Message from [employer], Generic sent at 12/28/2018  8:56 PM EST -----    Dr. Michel,     I noticed today when doing light yoga that this pattern of lumps showed up around my scar. Is this just scar tissue or something I need to address?    Thanks

## 2018-12-31 NOTE — TELEPHONE ENCOUNTER
I spoke with patient she said that last Friday on 12/28/18 she was doing some yoga sitting back on her feet with her toes but has noticed a raised bump area at her scar now.  She denied any redness or drainage.  She will refrain from any yoga activities at this point  I will check her later this week for repeat evaluation.

## 2019-01-07 ENCOUNTER — OFFICE VISIT (OUTPATIENT)
Dept: ORTHOPEDIC SURGERY | Facility: CLINIC | Age: 29
End: 2019-01-07

## 2019-01-07 VITALS — HEIGHT: 67 IN | WEIGHT: 130 LBS | BODY MASS INDEX: 20.4 KG/M2 | TEMPERATURE: 97.7 F

## 2019-01-07 DIAGNOSIS — M20.11 HALLUX VALGUS OF RIGHT FOOT: Primary | ICD-10-CM

## 2019-01-07 DIAGNOSIS — M77.41 METATARSALGIA OF RIGHT FOOT: ICD-10-CM

## 2019-01-07 DIAGNOSIS — M92.71 FREIBERG'S DISEASE, RIGHT: ICD-10-CM

## 2019-01-07 PROCEDURE — 73630 X-RAY EXAM OF FOOT: CPT | Performed by: ORTHOPAEDIC SURGERY

## 2019-01-07 PROCEDURE — 99024 POSTOP FOLLOW-UP VISIT: CPT | Performed by: ORTHOPAEDIC SURGERY

## 2019-01-07 NOTE — PROGRESS NOTES
"Foot Follow Up      Patient: Sarah E Reyes    YOB: 1990 28 y.o. female    Chief Complaints: Foot pain    History of Present Illness:She had debridement of right second metatarsal for Freiberg's infraction on 11/13/18 .    She was last seen on 12/10/18 with instructions to progress with activity.  She was instructed to follow-up in 6 weeks but we brought her in earlier as she had called last week and said when she was doing some yoga sitting on her feet she noticed some bumps along her scar.    She's been walking about 3 and half miles regularly with some achiness in the foot around the second MTP joint but states that it is better than before surgery.  HPI    ROS: Foot pain  Past Medical History:   Diagnosis Date   • Anxiety    • GERD (gastroesophageal reflux disease)    • Right foot pain      Physical Exam:   Vitals:    01/07/19 0951   Temp: 97.7 °F (36.5 °C)   Weight: 59 kg (130 lb)   Height: 170.2 cm (67\")     Well developed with normal mood.  Right foot incision appears well-healed there several small raised area along the incision but no warmth erythema or obvious sutures.  She really had no pain with range of motion of the second MTP joint which was somewhat limited to about a 60° arc.  Brisk capillary refill to the toe minimal discomfort under the metatarsal head      Radiology: 3 views of the right foot ordered to evaluate alignment reviewed and compared to previous x-rays there remains adequate decompression of the second metatarsal head which appears to be well contoured and joint is congruent      Assessment/Plan:  Status post right second metatarsal Freiberg's infraction debridement    Reviewed with her this may be some subcutaneous absorbable sutures that had not yet absorbed vitals any sign of infection.  We'll keep an eye on this.    I demonstrated heel cord stretching exercises for her to do at least 4 times a day.  Instruction sheet was provided and demonstrated for her and discussed " etiology of heel cord stretching to forefoot overload.    She was also fitted with a metatarsal pad    I will see her back in about 6 weeks.

## 2019-08-06 ENCOUNTER — OFFICE VISIT (OUTPATIENT)
Dept: INTERNAL MEDICINE | Facility: CLINIC | Age: 29
End: 2019-08-06

## 2019-08-06 VITALS
HEIGHT: 67 IN | WEIGHT: 129 LBS | HEART RATE: 66 BPM | OXYGEN SATURATION: 100 % | SYSTOLIC BLOOD PRESSURE: 118 MMHG | DIASTOLIC BLOOD PRESSURE: 80 MMHG | BODY MASS INDEX: 20.25 KG/M2

## 2019-08-06 DIAGNOSIS — E03.9 ACQUIRED HYPOTHYROIDISM: Chronic | ICD-10-CM

## 2019-08-06 DIAGNOSIS — K21.9 GASTROESOPHAGEAL REFLUX DISEASE WITHOUT ESOPHAGITIS: Primary | Chronic | ICD-10-CM

## 2019-08-06 DIAGNOSIS — F41.9 ANXIETY: Chronic | ICD-10-CM

## 2019-08-06 DIAGNOSIS — M85.60 BONE CYST: Chronic | ICD-10-CM

## 2019-08-06 PROCEDURE — 99204 OFFICE O/P NEW MOD 45 MIN: CPT | Performed by: INTERNAL MEDICINE

## 2019-08-06 RX ORDER — LEVOTHYROXINE SODIUM 0.03 MG/1
25 TABLET ORAL DAILY
COMMUNITY
Start: 2019-06-29

## 2019-08-06 NOTE — PROGRESS NOTES
Subjective   Sarah E Reyes is a 29 y.o. female.  Resents with chief complaint of mild anxiety, gastroesophageal reflux disease that is well controlled recent onset hypothyroidism and unfortunately recently a miscarriage due to the fact that the patient is Rh- which was not known at the time of the inception of her pregnancy.  We had a long discussion regarding the endocrinology associated with Rh- pregnancies hypothyroidism and the interplay with the immune system and fortunately she is going to see an OB/GYN within 1 week who is well acquainted with these issues and will provide excellent guidance and coverage for her.    History of Present Illness here for initial interview    The following portions of the patient's history were reviewed and updated as appropriate: allergies, current medications, past family history, past medical history, past social history, past surgical history and problem list.    Review of Systems   Constitutional: Negative.    HENT: Negative.    Eyes: Negative.    Respiratory: Negative.    Cardiovascular: Negative.    Gastrointestinal: Negative.    Endocrine: Negative.    Genitourinary: Negative.    Musculoskeletal: Negative.    Skin: Negative.    Allergic/Immunologic: Negative.    Neurological: Negative.    Psychiatric/Behavioral: The patient is nervous/anxious.        Objective   Physical Exam   Constitutional: She appears well-developed and well-nourished.   HENT:   Head: Normocephalic.   Eyes: Pupils are equal, round, and reactive to light.   Neck: Normal range of motion.   Cardiovascular: Normal rate, regular rhythm and normal heart sounds.   Pulmonary/Chest: Effort normal and breath sounds normal.   Abdominal: Soft. Bowel sounds are normal.   Musculoskeletal: Normal range of motion.   Neurological: She is alert.   Skin: Skin is warm.   Psychiatric: She has a normal mood and affect.   Nursing note and vitals reviewed.        Assessment/Plan   Fernanda was seen today for annual  exam.    Diagnoses and all orders for this visit:    Gastroesophageal reflux disease without esophagitis  Comments:  well controlled    Bone cyst  Comments:  s/p surgical removal    Anxiety  Comments:  doing well overall    Acquired hypothyroidism  Comments:  check a TSH

## 2019-09-06 ENCOUNTER — PATIENT MESSAGE (OUTPATIENT)
Dept: INTERNAL MEDICINE | Facility: CLINIC | Age: 29
End: 2019-09-06

## 2019-09-06 DIAGNOSIS — E03.9 ACQUIRED HYPOTHYROIDISM: Primary | ICD-10-CM

## 2019-09-09 NOTE — TELEPHONE ENCOUNTER
From: Sarah E Reyes  To: Hussein Gurrola MD  Sent: 9/6/2019 12:32 PM EDT  Subject: Non-Urgent Medical Question    Hi Dr. Gurrola,    I have an apt with Endocrinologist Dr Wagner 11/4 but they said if I get a referral I should be able to get in sooner. Can you please help me with that? I am wanting to get some answers and information on my endocrine system sooner rather than later.     Thank you for your help!

## 2019-10-07 ENCOUNTER — TELEPHONE (OUTPATIENT)
Dept: INTERNAL MEDICINE | Facility: CLINIC | Age: 29
End: 2019-10-07

## 2019-10-07 RX ORDER — ESCITALOPRAM OXALATE 10 MG/1
10 TABLET ORAL DAILY
Qty: 90 TABLET | Refills: 1 | Status: SHIPPED | OUTPATIENT
Start: 2019-10-07

## 2019-10-07 NOTE — TELEPHONE ENCOUNTER
----- Message from Tamara Virk sent at 10/7/2019 11:51 AM EDT -----  Contact: pt  Pt is calling for a refill     FOR  escitalopram (LEXAPRO) 10 MG tablet      Patient requests RX SENT TO   [facility]/pharmacy #[id number] - [place], [place] - [address] AT IN THE [place] - [phone] [facility] [phone] [facility]      Caller# [phone]     Please and thank you.

## 2021-04-16 ENCOUNTER — BULK ORDERING (OUTPATIENT)
Dept: CASE MANAGEMENT | Facility: OTHER | Age: 31
End: 2021-04-16

## 2021-04-16 DIAGNOSIS — Z23 IMMUNIZATION DUE: ICD-10-CM

## 2022-12-12 ENCOUNTER — TRANSCRIBE ORDERS (OUTPATIENT)
Dept: LAB | Facility: HOSPITAL | Age: 32
End: 2022-12-12

## 2022-12-12 ENCOUNTER — LAB (OUTPATIENT)
Dept: LAB | Facility: HOSPITAL | Age: 32
End: 2022-12-12

## 2022-12-12 DIAGNOSIS — Z3A.12 12 WEEKS GESTATION OF PREGNANCY: Primary | ICD-10-CM

## 2022-12-12 DIAGNOSIS — Z3A.12 12 WEEKS GESTATION OF PREGNANCY: ICD-10-CM

## 2022-12-12 DIAGNOSIS — E03.9 MYXEDEMA HEART DISEASE: ICD-10-CM

## 2022-12-12 DIAGNOSIS — I51.9 MYXEDEMA HEART DISEASE: ICD-10-CM

## 2022-12-12 LAB
ABO GROUP BLD: NORMAL
AMPHET+METHAMPHET UR QL: NEGATIVE
AMPHETAMINES UR QL: NEGATIVE
BARBITURATES UR QL SCN: NEGATIVE
BASOPHILS # BLD AUTO: 0.03 10*3/MM3 (ref 0–0.2)
BASOPHILS NFR BLD AUTO: 0.4 % (ref 0–1.5)
BENZODIAZ UR QL SCN: NEGATIVE
BILIRUB UR QL STRIP: NEGATIVE
BLD GP AB SCN SERPL QL: NEGATIVE
BUPRENORPHINE SERPL-MCNC: NEGATIVE NG/ML
CANNABINOIDS SERPL QL: NEGATIVE
CLARITY UR: CLEAR
COCAINE UR QL: NEGATIVE
COLOR UR: YELLOW
DEPRECATED RDW RBC AUTO: 44.2 FL (ref 37–54)
EOSINOPHIL # BLD AUTO: 0.11 10*3/MM3 (ref 0–0.4)
EOSINOPHIL NFR BLD AUTO: 1.4 % (ref 0.3–6.2)
ERYTHROCYTE [DISTWIDTH] IN BLOOD BY AUTOMATED COUNT: 13 % (ref 12.3–15.4)
GLUCOSE UR STRIP-MCNC: NEGATIVE MG/DL
HBV SURFACE AG SERPL QL IA: NORMAL
HCT VFR BLD AUTO: 38.8 % (ref 34–46.6)
HGB BLD-MCNC: 13.6 G/DL (ref 12–15.9)
HGB UR QL STRIP.AUTO: NEGATIVE
HIV1+2 AB SER QL: NORMAL
IMM GRANULOCYTES # BLD AUTO: 0.03 10*3/MM3 (ref 0–0.05)
IMM GRANULOCYTES NFR BLD AUTO: 0.4 % (ref 0–0.5)
KETONES UR QL STRIP: NEGATIVE
LEUKOCYTE ESTERASE UR QL STRIP.AUTO: NEGATIVE
LYMPHOCYTES # BLD AUTO: 1.53 10*3/MM3 (ref 0.7–3.1)
LYMPHOCYTES NFR BLD AUTO: 19.3 % (ref 19.6–45.3)
MCH RBC QN AUTO: 32.8 PG (ref 26.6–33)
MCHC RBC AUTO-ENTMCNC: 35.1 G/DL (ref 31.5–35.7)
MCV RBC AUTO: 93.5 FL (ref 79–97)
METHADONE UR QL SCN: NEGATIVE
MONOCYTES # BLD AUTO: 0.46 10*3/MM3 (ref 0.1–0.9)
MONOCYTES NFR BLD AUTO: 5.8 % (ref 5–12)
NEUTROPHILS NFR BLD AUTO: 5.78 10*3/MM3 (ref 1.7–7)
NEUTROPHILS NFR BLD AUTO: 72.7 % (ref 42.7–76)
NITRITE UR QL STRIP: NEGATIVE
NRBC BLD AUTO-RTO: 0 /100 WBC (ref 0–0.2)
OPIATES UR QL: NEGATIVE
OXYCODONE UR QL SCN: NEGATIVE
PCP UR QL SCN: NEGATIVE
PH UR STRIP.AUTO: 7.5 [PH] (ref 5–8)
PLATELET # BLD AUTO: 243 10*3/MM3 (ref 140–450)
PMV BLD AUTO: 9.5 FL (ref 6–12)
PROPOXYPH UR QL: NEGATIVE
PROT UR QL STRIP: NEGATIVE
RBC # BLD AUTO: 4.15 10*6/MM3 (ref 3.77–5.28)
RH BLD: NEGATIVE
SP GR UR STRIP: 1.01 (ref 1–1.03)
T4 SERPL-MCNC: 8.4 MCG/DL (ref 4.5–11.7)
TRICYCLICS UR QL SCN: NEGATIVE
TSH SERPL DL<=0.05 MIU/L-ACNC: 2.35 UIU/ML (ref 0.27–4.2)
UROBILINOGEN UR QL STRIP: NORMAL
WBC NRBC COR # BLD: 7.94 10*3/MM3 (ref 3.4–10.8)

## 2022-12-12 PROCEDURE — 84443 ASSAY THYROID STIM HORMONE: CPT

## 2022-12-12 PROCEDURE — 86901 BLOOD TYPING SEROLOGIC RH(D): CPT

## 2022-12-12 PROCEDURE — 87086 URINE CULTURE/COLONY COUNT: CPT

## 2022-12-12 PROCEDURE — 86850 RBC ANTIBODY SCREEN: CPT

## 2022-12-12 PROCEDURE — 84436 ASSAY OF TOTAL THYROXINE: CPT

## 2022-12-12 PROCEDURE — 87591 N.GONORRHOEAE DNA AMP PROB: CPT

## 2022-12-12 PROCEDURE — 86780 TREPONEMA PALLIDUM: CPT

## 2022-12-12 PROCEDURE — 81003 URINALYSIS AUTO W/O SCOPE: CPT

## 2022-12-12 PROCEDURE — 80306 DRUG TEST PRSMV INSTRMNT: CPT

## 2022-12-12 PROCEDURE — 85025 COMPLETE CBC W/AUTO DIFF WBC: CPT

## 2022-12-12 PROCEDURE — 36415 COLL VENOUS BLD VENIPUNCTURE: CPT

## 2022-12-12 PROCEDURE — 87491 CHLMYD TRACH DNA AMP PROBE: CPT

## 2022-12-12 PROCEDURE — 86762 RUBELLA ANTIBODY: CPT

## 2022-12-12 PROCEDURE — 86787 VARICELLA-ZOSTER ANTIBODY: CPT

## 2022-12-12 PROCEDURE — 86803 HEPATITIS C AB TEST: CPT

## 2022-12-12 PROCEDURE — 86900 BLOOD TYPING SEROLOGIC ABO: CPT

## 2022-12-12 PROCEDURE — 87340 HEPATITIS B SURFACE AG IA: CPT

## 2022-12-12 PROCEDURE — G0432 EIA HIV-1/HIV-2 SCREEN: HCPCS

## 2022-12-13 LAB
BACTERIA SPEC AEROBE CULT: NO GROWTH
HCV AB S/CO SERPL IA: <0.1 S/CO RATIO (ref 0–0.9)
HCV AB SERPL QL IA: NORMAL
RUBV IGG SERPL IA-ACNC: 2.33 INDEX
TREPONEMA PALLIDUM IGG+IGM AB [PRESENCE] IN SERUM OR PLASMA BY IMMUNOASSAY: NON REACTIVE
VZV IGG SER IA-ACNC: 1042 INDEX

## 2022-12-15 LAB
C TRACH RRNA SPEC QL NAA+PROBE: NEGATIVE
N GONORRHOEA RRNA SPEC QL NAA+PROBE: NEGATIVE

## 2023-01-30 ENCOUNTER — TRANSCRIBE ORDERS (OUTPATIENT)
Dept: LAB | Facility: HOSPITAL | Age: 33
End: 2023-01-30
Payer: COMMERCIAL

## 2023-01-30 ENCOUNTER — LAB (OUTPATIENT)
Dept: LAB | Facility: HOSPITAL | Age: 33
End: 2023-01-30
Payer: COMMERCIAL

## 2023-01-30 DIAGNOSIS — I51.9 MYXEDEMA HEART DISEASE: Primary | ICD-10-CM

## 2023-01-30 DIAGNOSIS — I51.9 MYXEDEMA HEART DISEASE: ICD-10-CM

## 2023-01-30 DIAGNOSIS — E03.9 MYXEDEMA HEART DISEASE: Primary | ICD-10-CM

## 2023-01-30 DIAGNOSIS — E03.9 MYXEDEMA HEART DISEASE: ICD-10-CM

## 2023-01-30 LAB — TSH SERPL DL<=0.05 MIU/L-ACNC: 3.12 UIU/ML (ref 0.27–4.2)

## 2023-01-30 PROCEDURE — 84443 ASSAY THYROID STIM HORMONE: CPT

## 2023-01-30 PROCEDURE — 36415 COLL VENOUS BLD VENIPUNCTURE: CPT

## 2023-04-03 ENCOUNTER — TRANSCRIBE ORDERS (OUTPATIENT)
Dept: LAB | Facility: HOSPITAL | Age: 33
End: 2023-04-03
Payer: COMMERCIAL

## 2023-04-03 ENCOUNTER — LAB (OUTPATIENT)
Dept: LAB | Facility: HOSPITAL | Age: 33
End: 2023-04-03
Payer: COMMERCIAL

## 2023-04-03 DIAGNOSIS — Z34.93 THIRD TRIMESTER PREGNANCY: ICD-10-CM

## 2023-04-03 DIAGNOSIS — Z34.93 THIRD TRIMESTER PREGNANCY: Primary | ICD-10-CM

## 2023-04-03 LAB
BASOPHILS # BLD AUTO: 0.03 10*3/MM3 (ref 0–0.2)
BASOPHILS NFR BLD AUTO: 0.3 % (ref 0–1.5)
BLD GP AB SCN SERPL QL: NEGATIVE
DEPRECATED RDW RBC AUTO: 48.5 FL (ref 37–54)
EOSINOPHIL # BLD AUTO: 0.23 10*3/MM3 (ref 0–0.4)
EOSINOPHIL NFR BLD AUTO: 2.5 % (ref 0.3–6.2)
ERYTHROCYTE [DISTWIDTH] IN BLOOD BY AUTOMATED COUNT: 13.4 % (ref 12.3–15.4)
GLUCOSE 1H P 100 G GLC PO SERPL-MCNC: 115 MG/DL (ref 65–139)
HCT VFR BLD AUTO: 33.9 % (ref 34–46.6)
HGB BLD-MCNC: 11.4 G/DL (ref 12–15.9)
IMM GRANULOCYTES # BLD AUTO: 0.04 10*3/MM3 (ref 0–0.05)
IMM GRANULOCYTES NFR BLD AUTO: 0.4 % (ref 0–0.5)
LYMPHOCYTES # BLD AUTO: 0.87 10*3/MM3 (ref 0.7–3.1)
LYMPHOCYTES NFR BLD AUTO: 9.4 % (ref 19.6–45.3)
MCH RBC QN AUTO: 33.1 PG (ref 26.6–33)
MCHC RBC AUTO-ENTMCNC: 33.6 G/DL (ref 31.5–35.7)
MCV RBC AUTO: 98.5 FL (ref 79–97)
MONOCYTES # BLD AUTO: 0.64 10*3/MM3 (ref 0.1–0.9)
MONOCYTES NFR BLD AUTO: 6.9 % (ref 5–12)
NEUTROPHILS NFR BLD AUTO: 7.48 10*3/MM3 (ref 1.7–7)
NEUTROPHILS NFR BLD AUTO: 80.5 % (ref 42.7–76)
NRBC BLD AUTO-RTO: 0 /100 WBC (ref 0–0.2)
PLATELET # BLD AUTO: 197 10*3/MM3 (ref 140–450)
PMV BLD AUTO: 9.4 FL (ref 6–12)
RBC # BLD AUTO: 3.44 10*6/MM3 (ref 3.77–5.28)
T4 FREE SERPL-MCNC: 1.08 NG/DL (ref 0.93–1.7)
TSH SERPL DL<=0.05 MIU/L-ACNC: 1.64 UIU/ML (ref 0.27–4.2)
WBC NRBC COR # BLD: 9.29 10*3/MM3 (ref 3.4–10.8)

## 2023-04-03 PROCEDURE — 84439 ASSAY OF FREE THYROXINE: CPT

## 2023-04-03 PROCEDURE — 84443 ASSAY THYROID STIM HORMONE: CPT

## 2023-04-03 PROCEDURE — 86850 RBC ANTIBODY SCREEN: CPT

## 2023-04-03 PROCEDURE — 85025 COMPLETE CBC W/AUTO DIFF WBC: CPT

## 2023-04-03 PROCEDURE — 82950 GLUCOSE TEST: CPT

## 2023-04-03 PROCEDURE — 36415 COLL VENOUS BLD VENIPUNCTURE: CPT

## 2023-06-22 PROBLEM — Z3A.40 40 WEEKS GESTATION OF PREGNANCY: Status: RESOLVED | Noted: 2023-06-22 | Resolved: 2023-06-22

## 2023-06-22 PROBLEM — Z3A.40 40 WEEKS GESTATION OF PREGNANCY: Status: ACTIVE | Noted: 2023-06-22

## 2023-06-24 PROBLEM — I80.9 SUPERFICIAL THROMBOPHLEBITIS: Status: ACTIVE | Noted: 2023-06-24

## 2023-07-27 ENCOUNTER — TRANSCRIBE ORDERS (OUTPATIENT)
Dept: ADMINISTRATIVE | Facility: HOSPITAL | Age: 33
End: 2023-07-27
Payer: COMMERCIAL

## 2023-07-27 DIAGNOSIS — R22.42 LOCALIZED SWELLING OF LEFT LOWER EXTREMITY: Primary | ICD-10-CM

## 2023-07-27 DIAGNOSIS — M79.605 LEG PAIN, LEFT: ICD-10-CM

## 2023-08-03 ENCOUNTER — HOSPITAL ENCOUNTER (OUTPATIENT)
Dept: CARDIOLOGY | Facility: HOSPITAL | Age: 33
Discharge: HOME | End: 2023-08-03
Admitting: NURSE PRACTITIONER
Payer: COMMERCIAL

## 2023-08-03 VITALS — WEIGHT: 160 LBS | HEIGHT: 67 IN | BODY MASS INDEX: 25.11 KG/M2

## 2023-08-03 DIAGNOSIS — R22.42 LOCALIZED SWELLING OF LEFT LOWER EXTREMITY: ICD-10-CM

## 2023-08-03 DIAGNOSIS — M79.605 LEG PAIN, LEFT: ICD-10-CM

## 2023-08-03 LAB
BH CV LOW VAS LEFT VARICOSITY AK VESSEL: 1
BH CV LOWER VASCULAR LEFT COMMON FEMORAL AUGMENT: NORMAL
BH CV LOWER VASCULAR LEFT COMMON FEMORAL COMPRESS: NORMAL
BH CV LOWER VASCULAR LEFT COMMON FEMORAL PHASIC: NORMAL
BH CV LOWER VASCULAR LEFT COMMON FEMORAL SPONT: NORMAL
BH CV LOWER VASCULAR LEFT DISTAL FEMORAL COMPRESS: NORMAL
BH CV LOWER VASCULAR LEFT GASTRONEMIUS COMPRESS: NORMAL
BH CV LOWER VASCULAR LEFT GREATER SAPH AK COMPRESS: NORMAL
BH CV LOWER VASCULAR LEFT GREATER SAPH BK COMPRESS: NORMAL
BH CV LOWER VASCULAR LEFT LESSER SAPH COMPRESS: NORMAL
BH CV LOWER VASCULAR LEFT MID FEMORAL AUGMENT: NORMAL
BH CV LOWER VASCULAR LEFT MID FEMORAL COMPRESS: NORMAL
BH CV LOWER VASCULAR LEFT MID FEMORAL PHASIC: NORMAL
BH CV LOWER VASCULAR LEFT MID FEMORAL SPONT: NORMAL
BH CV LOWER VASCULAR LEFT PERONEAL COMPRESS: NORMAL
BH CV LOWER VASCULAR LEFT POPLITEAL AUGMENT: NORMAL
BH CV LOWER VASCULAR LEFT POPLITEAL COMPRESS: NORMAL
BH CV LOWER VASCULAR LEFT POPLITEAL PHASIC: NORMAL
BH CV LOWER VASCULAR LEFT POPLITEAL SPONT: NORMAL
BH CV LOWER VASCULAR LEFT POSTERIOR TIBIAL COMPRESS: NORMAL
BH CV LOWER VASCULAR LEFT PROFUNDA FEMORAL COMPRESS: NORMAL
BH CV LOWER VASCULAR LEFT PROXIMAL FEMORAL COMPRESS: NORMAL
BH CV LOWER VASCULAR LEFT SAPHENOFEMORAL JUNCTION COMPRESS: NORMAL
BH CV LOWER VASCULAR LEFT SOLEAL COMPRESS: NORMAL
BH CV LOWER VASCULAR LEFT VARICOSITY AK COMPRESS: NORMAL
BH CV LOWER VASCULAR RIGHT COMMON FEMORAL AUGMENT: NORMAL
BH CV LOWER VASCULAR RIGHT COMMON FEMORAL COMPRESS: NORMAL
BH CV LOWER VASCULAR RIGHT COMMON FEMORAL PHASIC: NORMAL
BH CV LOWER VASCULAR RIGHT COMMON FEMORAL SPONT: NORMAL
BH CV VAS PRELIMINARY FINDINGS SCRIPTING: 1

## 2023-08-03 PROCEDURE — 93971 EXTREMITY STUDY: CPT

## 2023-08-03 PROCEDURE — 93971 EXTREMITY STUDY: CPT | Performed by: INTERNAL MEDICINE

## 2024-04-05 ENCOUNTER — PATIENT ROUNDING (BHMG ONLY) (OUTPATIENT)
Dept: URGENT CARE | Facility: CLINIC | Age: 34
End: 2024-04-05
Payer: COMMERCIAL

## 2024-04-05 NOTE — ED NOTES
Thank you for letting us care for you in your recent visit to our [place] urgent care center. We would love to hear about your experience with us. Was this the first time you have visited our location?    We’re always looking for ways to make our patients’ experiences even better. Do you have any recommendations on ways we may improve?     I appreciate you taking the time to respond. Please be on the lookout for a survey about your recent visit from [employer] via text or email. We would greatly appreciate if you could fill that out and turn it back in. We want your voice to be heard and we value your feedback.   Thank you for choosing [facility] for your healthcare needs.     If you have concerns or would like to speak to me in person regarding your visit please feel free to give me a call.     Hope you get well soon and thank you.    Kaylen Gupta (RN) Practice Manager

## (undated) DEVICE — BNDG ELAS CO-FLEX SLF ADHR 2IN 5YD LF STRL

## (undated) DEVICE — Device: Brand: DEFENDO AIR/WATER/SUCTION AND BIOPSY VALVE

## (undated) DEVICE — DRSNG GZ PETROLTM XEROFORM CURAD 1X8IN STRL

## (undated) DEVICE — APPL CHLORAPREP W/TINT 26ML ORNG

## (undated) DEVICE — UNDERCAST PADDING: Brand: DEROYAL

## (undated) DEVICE — GLV SURG TRIUMPH CLASSIC PF LTX 8 STRL

## (undated) DEVICE — BITEBLOCK OMNI BLOC

## (undated) DEVICE — SKIN PREP TRAY W/CHG: Brand: MEDLINE INDUSTRIES, INC.

## (undated) DEVICE — BNDG GZ SOF-FORM CONFRM 2X75IN LF STRL

## (undated) DEVICE — CANNULA,ADULT,SOFT-TOUCH,7'TUBE,UC: Brand: PENDING

## (undated) DEVICE — GLV SURG BIOGEL LTX PF 8

## (undated) DEVICE — GOWN,NON-REINFORCED,SIRUS,SET IN SLV,XXL: Brand: MEDLINE

## (undated) DEVICE — BNDG ELAS CO-FLEX SLF ADHR 4IN5YD LF STRL

## (undated) DEVICE — PK ORTHO MINOR TOWER 40

## (undated) DEVICE — TUBING, SUCTION, 1/4" X 10', STRAIGHT: Brand: MEDLINE

## (undated) DEVICE — SUT ETHLN 4/0 P3 18IN 699G

## (undated) DEVICE — TRANSPOSAL ULTRAFLEX DUO/QUAD ULTRA CART MANIFOLD

## (undated) DEVICE — FRCP BX RADJAW4 NDL 2.8 240CM LG OG BX40

## (undated) DEVICE — DISPOSABLE TOURNIQUET CUFF SINGLE BLADDER, SINGLE PORT AND QUICK CONNECT CONNECTOR: Brand: COLOR CUFF